# Patient Record
Sex: MALE | Race: WHITE | NOT HISPANIC OR LATINO | Employment: STUDENT | URBAN - METROPOLITAN AREA
[De-identification: names, ages, dates, MRNs, and addresses within clinical notes are randomized per-mention and may not be internally consistent; named-entity substitution may affect disease eponyms.]

---

## 2023-11-27 DIAGNOSIS — G40.109 EPILEPSY, LOCALIZATION-RELATED (MULTI): ICD-10-CM

## 2023-11-27 RX ORDER — MIDAZOLAM 5 MG/.1ML
1 SPRAY NASAL AS NEEDED
COMMUNITY
End: 2024-03-11 | Stop reason: SDUPTHER

## 2023-11-27 RX ORDER — CENOBAMATE 200 MG/1
2 TABLET, FILM COATED ORAL NIGHTLY
COMMUNITY
Start: 2023-11-13 | End: 2023-12-12 | Stop reason: SDUPTHER

## 2023-11-27 RX ORDER — PERAMPANEL 10 MG/1
10 TABLET ORAL NIGHTLY
COMMUNITY
End: 2023-11-27 | Stop reason: SDUPTHER

## 2023-11-27 RX ORDER — TOPIRAMATE 200 MG/1
3 CAPSULE, EXTENDED RELEASE ORAL NIGHTLY
COMMUNITY
End: 2023-12-12 | Stop reason: SDUPTHER

## 2023-12-12 DIAGNOSIS — G40.109 EPILEPSY, LOCALIZATION-RELATED (MULTI): ICD-10-CM

## 2023-12-12 RX ORDER — CENOBAMATE 200 MG/1
2 TABLET, FILM COATED ORAL NIGHTLY
Qty: 60 TABLET | Refills: 5 | Status: SHIPPED | OUTPATIENT
Start: 2023-12-12 | End: 2024-06-09

## 2023-12-12 RX ORDER — TOPIRAMATE 200 MG/1
3 CAPSULE, EXTENDED RELEASE ORAL NIGHTLY
Qty: 90 CAPSULE | Refills: 5 | Status: SHIPPED | OUTPATIENT
Start: 2023-12-12 | End: 2024-06-09

## 2024-03-11 DIAGNOSIS — R56.9 SEIZURE (MULTI): ICD-10-CM

## 2024-03-11 RX ORDER — MIDAZOLAM 5 MG/.1ML
5 SPRAY NASAL AS NEEDED
Qty: 4 EACH | Refills: 1 | Status: SHIPPED | OUTPATIENT
Start: 2024-03-11 | End: 2025-03-11

## 2024-05-28 DIAGNOSIS — G40.109 EPILEPSY, LOCALIZATION-RELATED (MULTI): ICD-10-CM

## 2024-05-28 NOTE — TELEPHONE ENCOUNTER
Renewal request received for Fycompa 10mg.   Upcoming appointment 8/21/24.    Dede Bateman, BSN, RN  Registered Nurse Level 3  Pediatric Epilepsy

## 2024-08-20 ENCOUNTER — TELEPHONE (OUTPATIENT)
Dept: PEDIATRIC NEUROLOGY | Facility: HOSPITAL | Age: 24
End: 2024-08-20
Payer: COMMERCIAL

## 2024-08-20 NOTE — TELEPHONE ENCOUNTER
*fuv // Taylor confirmed 8/14/24 11:38am/ mom oked appt for aug 21 24 @ 11 am in [place] with Dr Yuliet hardinw

## 2024-08-21 ENCOUNTER — APPOINTMENT (OUTPATIENT)
Dept: PEDIATRIC NEUROLOGY | Facility: CLINIC | Age: 24
End: 2024-08-21
Payer: COMMERCIAL

## 2024-08-21 VITALS
HEIGHT: 65 IN | OXYGEN SATURATION: 98 % | WEIGHT: 275.57 LBS | SYSTOLIC BLOOD PRESSURE: 115 MMHG | BODY MASS INDEX: 45.91 KG/M2 | DIASTOLIC BLOOD PRESSURE: 76 MMHG | TEMPERATURE: 97.8 F | HEART RATE: 50 BPM

## 2024-08-21 DIAGNOSIS — G40.109 EPILEPSY, LOCALIZATION-RELATED (MULTI): ICD-10-CM

## 2024-08-21 DIAGNOSIS — G40.109: ICD-10-CM

## 2024-08-21 PROCEDURE — 3008F BODY MASS INDEX DOCD: CPT | Performed by: PSYCHIATRY & NEUROLOGY

## 2024-08-21 PROCEDURE — 99215 OFFICE O/P EST HI 40 MIN: CPT | Performed by: PSYCHIATRY & NEUROLOGY

## 2024-08-21 NOTE — PROGRESS NOTES
Patient Discussion/Summary  1. Continue Torkendi XR to 600 mg once a day.     2. Continue Fycompa to 10 mg at bedtime     3. Continue Xcopri to 400 mg. Call with seizure report     4. Follow up in May 2025 VIRTUALLY if stable-he needs to be seen every 9 months for the purpose of prior authorization    5. 10 year Handicap placard, and I-PAD communication device.    6. 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 His nocturnal frontal lobe epilepsy was confirmed by video EEG as does his brother. He has been quite resistant to medical management in the past. His prior MRI scans have been normal.     Carrier for Claudia Olivier. Parents will get tested.     This 24hr vEEG confirms the diagnosis of nocturnal frontal lobe epilepsy. We recorded 2 subclinical seizures during sleep, arising from the right frontal region with temporal spread. The interictal EEG showed continuous slowing over the right hemisphere, maximum right fronto-temporal, as well as spikes in that same region, that were activated during sleep. His Zonisamide will remain unchanged at 600mg QHS; however, the plan is to increase the Onfi from 30mg at bedtime to 40mg at bedtime. Follow up with Dr. Horvath. Patient had a brain MRI scheduled early morning on 11/18 after discharge ~ results pending. Patient was discharged on 11/18/2015. 11/2015:     Past AEDs include Keppra, phenobarbital, and Trileptal. He has maxed out on Onfi and syndesmotic. He continues to have seizures per father. I will try Fycompa after weaning off Onfi. Father does not think that Onfi is helping or or has helped. We will wean off of zonisamide at that same time Fycompa is initiated. Discussed the side effects of Fycompa     Interim history as of 6/21/2017:  3 yelling episodes last night. The family woke up. Seizures occur 2  times a week. There may be ones where he does not scream loudly at night.   Still have not heard from Dr. Alegria's office about [facility] insurance. Epilepsy surgery decided and waiting.  ------------------------------------  As of 6/6/2018:  Seizure semiology has not changed. He has seizures at night at least 4 x per week, may be more. He has also had some bed wetting episodes. Father lost his job and insurance; he was unable to bring his son to the clinic until he obtained [facility] and medicaid. +weight gain. Medication side effects reviewed.  ----------------------------------  As of 10/24/2018:  NO seizures as seen before since the epilepsy surgery-temporal lobectomy- in July 2018. Weill wean Onfi by 5 mg weekly. Continue Trokendi XR.  -----------------------------------------------------------  As of 3/13/2019:  Will need a follow up brain MRI and 48 hr VEEG. Trokendi XR was increased to 500 mg QD for EEG seizures last admission to EMU in Feb 2019. Increased words, more alert. No clinical seizure.  -------------------------------------------------------------  As of 9/24/2019  He was admitted for a 24 hour repeat vEEG to assess efficacy of med increase and to perform post-op brain MRI (under Peds Anesthesia). Two subclinical seizure during sleep, arising from the right frontal region were recorded. Interictal EEG findings show right fronto-temporal spikes and continuous slow right.  MRI showed postoperative changes.     I will increase Trokendi XR by 100 mg given his weight, which is almost double the ideal adult weight. He has night time awakenings that are probably brief seizures as they resemble the ones recorded in the EMU in June 2019.  -------------------------------------------------  As of 3/23/2020  Telehealth visit-audio only      Suhail is doing well. He is more verbal and does more daily activities than he has before. Dad think he may be having some seizures in sleep because he vocalizes sometimes. But in  light of his improved alertness, verbal ability, and daily activities, dad is comfortable postponing any medication changes. I did introduce the idea of adding LMG at some point.  ---------------------------  As of 7/15/2020  In June 2020, in response to convulsive seizures, Fycompa and clorazepate were added as below. No seizure since. But Dad think Suhail may be more sleepy.  - Start Fycompa 4mg QHS x 2 weeks, 6mg QHS x 2 weeks, 8mg QHS x 2 weeks, 10mg QHS thereafter.  - clorazepate 7.5mg BID x 1 week, then 15mg BID thereafter as a bridge until the Fycompa is at full dose.     Previous AEDs: LEV, OXC, PB, TPM, Onfi, Vimpat, ZON, Trokendi  Untried AEDs: ZNS, LTG, VPA (but he is already obese), PHT, PB, CBZ, FBM, RUF   ---------------------------  As of 5/26/2021     Seizure frequency is better. Probably 3-4 times a week. Still wets his bed daily. Had seen a urologist who diagnosed him with UTI and treated with ABX.  ---------------  As of 10/27/2021  Doing well. May have some seizures, but unable to tell. MOm says he has not seen seizures. Had COVID Aug 16.   Wt 123 kg. Lost 20 poiunds since April, last visit. Xcopri has been helpful.     Current AEDs:   - Trokendi XR 200mg tabs = 600mg (3 at HS) ~7.2 mg/kg/day  - Fycompa 10mg HS (started June 2020)  - Xcopri 300 mg/day since 3/30/21  --------------------------  As of 5/11/2022  Doing well. May have some seizures, but unable to tell. Xcopri has been helpful.  Stable. Seizures seems stable. NO worsening of seizures.   Current AEDs:   - Trokendi XR 200mg tabs = 600mg (3 at HS) ~7.2 mg/kg/day  - Fycompa 10mg HS (started June 2020)  - Xcopri 300 mg/day since 3/30/21  ------------------------------------  As of 11/9/2022  Doing well. May have some seizures, but unable to tell. Xcopri has been helpful.  Stable. Seizures seems stable. NO worsening of seizures.   Current AEDs:   - Trokendi XR 200mg tabs = 600mg (3 at HS) ~7.2 mg/kg/day  - Fycompa 10mg HS (started June  2020)  - Xcopri 300 mg/day since 3/30/21  ------------------------------------  As of 5/24/2023  Doing well. May have some seizures, but unable to tell. Xcopri has been helpful.  Stable. Seizures seems stable. NO worsening of seizures.   Current AEDs:   - Trokendi XR 200mg tabs = 600mg (3 at HS) ~7.2 mg/kg/day  - Fycompa 10mg HS (started June 2020)  - Xcopri 300 mg/day since 3/30/21-->increase to 400 mg QD as ther eis room for improvement of seizures. He also gained some weight     -------------------------------------------------------------------------------------------------------------------------------------  Risk and benefits were discussed in detail, and the plan reflects preference of the caretaker(s). Anticipatory guidance regarding seizure precaution was given. Antiepileptic drug (s) side effects, safe handling, monitoring for possible neuropsychiatric comorbidities, as well as the the rare possibility of SUDEP were discussed.   This note was created using speech recognition transcription software. Despite proofreading, several typographical errors might be present that might affect the meaning of the content. Please call with any questions.  ------------------------------------------------------------------------------------------------------------------------------------------  CONTROLLED SUBSTANCE-DOCUMENTATION     I have personally reviewed the OAS report. This report is scanned into the electronic medical record. I have considered the risks of abuse, dependence, addiction and diversion. I believe that it is clinically appropriate to be prescribed this medication. Also, I believe that it is clinically appropriate for this patient to be prescribed this medication. Based on the patient's condition and response to current treatment regimen, I do not feel that it is clinically necessary for this patient to be seen in the office every 90 days.      (diazepam, clonazepam, IN midazolam)  What is the  "patient's goal of therapy? Seizure rescue medicine  Is this being achieved with current treatment? Yes     (clobazam, lacosamide, perampanel, Epidiolex, briviacetam)  What is the patient's goal of therapy? Seizure treatment  Is this being achieved with current treatment? Yes     UDS is not clinically indicated.  Assessed for risk of addiction, abuse and/or diversion using \"red flags.\"  Patient was counseled on the abuse potential. Patient was educated on the risk and effect of combining multiple controlled substances. Patient voiced understanding of the risks of combination of opioids and benzodiazepines, and I discussed alternative treatment options when applicable.   Patient was reminded that it is both unsafe and unlawful to give away or sell controlled substance.  Discussed proper and secure storage and disposal of unused medications.         Chief Complaint     patient is here for follow up, Epilespy   Accompanied by father.      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 His nocturnal frontal lobe epilepsy was confirmed by video EEG as does his brother. He has been quite resistant to medical management in the past. His prior MRI scans have been normal.     video EEG evaluation with his brother which showed right temporal discharges during a number of hypermotor seizures that he had every night.     Past visit:  Has seizures every few days at night per father. Last VEEG recorded seizures in 11/2015:  This 24hr vEEG confirms the diagnosis of nocturnal frontal lobe epilepsy. We recorded 2 subclinical seizures during sleep, arising from the right frontal region with temporal spread. The interictal EEG showed continuous slowing over the right hemisphere, maximum right fronto-temporal, as well as spikes in that same region, that were activated during sleep. His Zonisamide will remain unchanged at 600mg " QHS; however, the plan is to increase the Onfi from 30mg at bedtime to 40mg at bedtime. Follow up with Dr. Horvath. Patient had a brain MRI scheduled early morning on 11/18 after discharge ~ results pending. Patient was discharged on 11/18/2015. 11/2015:     Past AEDs: PHB, Keppra, OXC, ZNG, ONfi  --------------------------  Inerim hisotory as of 6/21/2017:  3 yelling episodes last night. The family woke up. Seizures occur 2 times a week. There may be ones where he does not scream loudly at night.   Still have not heard from Dr. Alegria's office about [facility] iinsurance.   ------------------------------  As of 6/6/2018:  Seizure semiology has not changed. He has seizures at night at least 4 x per week, may be more. He has also had some bed wetting episodes. Father lost his job and insurance; he was unable to bring his son to the clinic until he obtained [facility] and medicaid.  ---------------------  As of 10/24/2018:  NO seizures as seen before since the epilepsy surgery-temporal lobectomy- in July 2018. Weill wean Onfi by 5 mg weekly. Continue Trokendi XR.  -----------------------------------  As of 3/13/2019:     Will need a follow up brain MRI and 48 hr VEEG. Trokendi XR was increased to 500 mg QD for EEG seizures last admission to EMU in Feb 2019. Increased words, more alert. No clinical seizure.  2/2019 VEEG post surgery:  Suhail is a 18 year old boy status post right temporal lobectomy in July 2018, here for a 6 month follow up. Onfi was weaned off and remains on monotherapy Trokendi XR 400mg QHS. During this 48hr vEEG we recorded eight subclinical EEG seizures out of sleep, arising from the right frontal region. Trokendi XR will be increased to 500mg QHS,. He will need to come back in a couple of weeks for a repeat vEEG and to get a brain MRI.   ---------------------------------------------------------------  As of 9/24/2019  He was admitted for a 24 hour repeat vEEG to assess efficacy of med increase and to perform  "post-op brain MRI (under Peds Anesthesia). Two subclinical seizure during sleep, arising from the right frontal region were recorded. Interictal EEG findings show right fronto-temporal spikes and continuous slow right.  MRI showed postoperative changes.  ONfi weaned off since no seizures, but since 2 seizures were recorded during 24 hr admission to EMU, I will consider add back Onfi. Other choice is Fycompa.  ----------------------  As of 3/23/2020  Telehealth visit-audio only     I spoke with Suhail's dad. Suhail is doing well. He is more verbal and does more daily activities than he has before. Dad think he may be having some seizures in sleep because he vocalizes sometimes. But in light of his improved alertness, verbal ability, and daily activities, dad is comfortable postponing any medication changes. I did introduce the idea of adding LMG at some point.  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 At 1:30AM and 6:30AM he had GTCs this morning. He received KLN with the last one. Is sleeping now. No fever. Has had diarrhea and has been getting Immodium. No other signs of illness. Yesterday he was quieter than usual, had a \"head/neck tremor\" 3-4 times yesterday lasting 10 sec each. Dad had a hard time describing it. He has a rash on the back of his neck since yesterday (red, spotty, not raised, about the size of an orange)  - Start Fycompa 4mg QHS x 2 weeks, 6mg QHS x 2 weeks, 8mg QHS x 2 weeks, 10mg QHS thereafter.  - clorazepate 7.5mg BID x 1 week, then 15mg " BID thereafter as a bridge until the Fycompa is at full dose.     Dad states Suhail seems to be sleepy more often. No seizures since the medication changes on June 4.  --------------------  As of 5/26/2021  Seizure frequency is better. Probably 3-4 times a week. Still wets his bed daily. Had seen a urologist who diagnosed him with UTI and treated with ABX. Overweight 290 pounds  ----------------  As of 10/27/2021     Current AEDs:   - Trokendi XR 200mg tabs = 600mg (3 at HS) ~7.2mg/kg/day  - Fycompa 10mg HS (started June 2020)  - Xcopri 200mg/day since 3/30/21  ---------------------  As of 5/11/2022  - Trokendi XR 200mg tabs = 600mg (3 at HS) ~7.2mg/kg/day  - Fycompa 10mg HS (started June 2020)  - Xcopri 300mg/day since 3/30/21     Stable. Seizures seems stable. NO worsening of seizures.   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  And as a result lost weight!  He now travels with his dad during his job.  Ernesto also goes bowling and fishing with his dad.  Rare episodes at night -screaming. Dad doesn't remember the last time.   Stable. Seizures seems stable. NO worsening of seizures.     REVIEW OF SYSTEMS:  A complete review of systems was performed and was negative for complaint with the exception of that noted. Gained weight, but stable height. 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     Mental status: baseline  Speech: Nonverbal  Cranial Nerves:            Visual Fields: untestable            EOM: intact   Pupils: untestable   Face: symmetric smile   Hearing: Appears to be intact to voice   Palate: untestable   Shoulders: Untestable   Tongue: midline  Motor exam: No tremor   Arms: Arm movements against gravity normal   Legs: Normal ambulation  Sensory exam: untestable  Cerebellar: Normal gait  Reflexes: untestable  CV: nml S1S2, RRR, no mm  lungs: CTAB

## 2024-08-22 RX ORDER — CENOBAMATE 200 MG/1
2 TABLET, FILM COATED ORAL NIGHTLY
Qty: 180 TABLET | Refills: 1 | Status: SHIPPED | OUTPATIENT
Start: 2024-08-22 | End: 2025-02-18

## 2024-08-22 RX ORDER — TOPIRAMATE 200 MG/1
3 CAPSULE, EXTENDED RELEASE ORAL NIGHTLY
Qty: 270 CAPSULE | Refills: 1 | Status: SHIPPED | OUTPATIENT
Start: 2024-08-22 | End: 2025-02-18

## 2024-08-28 DIAGNOSIS — G40.109: ICD-10-CM

## 2024-08-28 DIAGNOSIS — G40.109 EPILEPSY, LOCALIZATION-RELATED (MULTI): ICD-10-CM

## 2024-08-28 RX ORDER — TOPIRAMATE 200 MG/1
3 CAPSULE, EXTENDED RELEASE ORAL NIGHTLY
Qty: 90 CAPSULE | Refills: 5 | Status: SHIPPED | OUTPATIENT
Start: 2024-08-28

## 2024-10-29 ENCOUNTER — TELEPHONE (OUTPATIENT)
Dept: PEDIATRIC NEUROLOGY | Facility: HOSPITAL | Age: 24
End: 2024-10-29
Payer: COMMERCIAL

## 2024-10-29 DIAGNOSIS — G40.109 EPILEPSY, LOCALIZATION-RELATED (MULTI): Primary | ICD-10-CM

## 2024-10-30 RX ORDER — TOPIRAMATE 200 MG/1
600 CAPSULE, EXTENDED RELEASE ORAL DAILY
Qty: 180 CAPSULE | Refills: 4 | Status: SHIPPED | OUTPATIENT
Start: 2024-10-30 | End: 2024-12-29

## 2025-02-24 DIAGNOSIS — G40.109 EPILEPSY, LOCALIZATION-RELATED (MULTI): ICD-10-CM

## 2025-02-24 DIAGNOSIS — G40.109: ICD-10-CM

## 2025-02-25 RX ORDER — CENOBAMATE 200 MG/1
2 TABLET, FILM COATED ORAL NIGHTLY
Qty: 180 TABLET | Refills: 1 | Status: SHIPPED | OUTPATIENT
Start: 2025-02-25 | End: 2025-08-24

## 2025-02-25 RX ORDER — TOPIRAMATE 200 MG/1
600 CAPSULE, EXTENDED RELEASE ORAL DAILY
Qty: 180 CAPSULE | Refills: 4 | Status: SHIPPED | OUTPATIENT
Start: 2025-02-25 | End: 2025-04-26

## 2025-04-30 ENCOUNTER — TELEPHONE (OUTPATIENT)
Dept: PEDIATRIC NEUROLOGY | Facility: CLINIC | Age: 25
End: 2025-04-30
Payer: COMMERCIAL

## 2025-04-30 NOTE — TELEPHONE ENCOUNTER
Father called [facility]  re: upcoming appointment. Appointment needs to be rescheduled as child has another appointment at similar time and would be late arriving for Dr. Horvath's allotted appointment time.  Will forward message to  to reschedule appointment.

## 2025-05-05 ENCOUNTER — TELEPHONE (OUTPATIENT)
Dept: PEDIATRIC NEUROLOGY | Facility: HOSPITAL | Age: 25
End: 2025-05-05
Payer: COMMERCIAL

## 2025-05-05 NOTE — TELEPHONE ENCOUNTER
Called mom on May 5th @ 320  pm and left msg to confirm appt for May 7th @ 8 1100am in [place]. Lm for mom to call back, no my chart.  Dad oked the appt

## 2025-05-07 ENCOUNTER — APPOINTMENT (OUTPATIENT)
Dept: PEDIATRIC NEUROLOGY | Facility: CLINIC | Age: 25
End: 2025-05-07
Payer: COMMERCIAL

## 2025-05-07 VITALS
OXYGEN SATURATION: 98 % | RESPIRATION RATE: 18 BRPM | HEART RATE: 65 BPM | DIASTOLIC BLOOD PRESSURE: 64 MMHG | WEIGHT: 279.98 LBS | SYSTOLIC BLOOD PRESSURE: 118 MMHG | TEMPERATURE: 98.6 F | BODY MASS INDEX: 45 KG/M2 | HEIGHT: 66 IN

## 2025-05-07 DIAGNOSIS — G40.109 EPILEPSY, LOCALIZATION-RELATED (MULTI): ICD-10-CM

## 2025-05-07 DIAGNOSIS — G40.804 INTRACTABLE EPILEPSY WITH BOTH GENERALIZED AND FOCAL FEATURES (MULTI): Primary | ICD-10-CM

## 2025-05-07 DIAGNOSIS — G40.109 PARTIAL EPILEPSY ORIGINATING IN FRONTAL LOBE: ICD-10-CM

## 2025-05-07 PROCEDURE — 3008F BODY MASS INDEX DOCD: CPT | Performed by: PSYCHIATRY & NEUROLOGY

## 2025-05-07 PROCEDURE — 99214 OFFICE O/P EST MOD 30 MIN: CPT | Performed by: PSYCHIATRY & NEUROLOGY

## 2025-05-07 RX ORDER — CENOBAMATE 200 MG/1
2 TABLET, FILM COATED ORAL NIGHTLY
Qty: 360 TABLET | Refills: 3 | Status: SHIPPED | OUTPATIENT
Start: 2025-05-07 | End: 2025-11-03

## 2025-05-07 RX ORDER — TOPIRAMATE 200 MG/1
3 CAPSULE, EXTENDED RELEASE ORAL NIGHTLY
Qty: 90 CAPSULE | Refills: 3 | Status: SHIPPED | OUTPATIENT
Start: 2025-05-07 | End: 2025-06-06

## 2025-05-07 RX ORDER — MIDAZOLAM 5 MG/.1ML
5 SPRAY NASAL ONCE AS NEEDED
Qty: 2 EACH | Refills: 2 | Status: SHIPPED | OUTPATIENT
Start: 2025-05-07

## 2025-05-07 NOTE — PROGRESS NOTES
Patient Discussion/Summary  1. Continue Torkendi XR to 600 mg once a day.     2. Continue Fycompa to 10 mg at bedtime     3. Continue Xcopri to 400 mg. Call with seizure report     4. Follow up in Feb 2026     5. 10 year Handicap placard, and I-PAD communication device.    6. Q3 mo supply of Xcopri and Trokendi Xr and Fycompa. *Wants to pick all 3 meds at the same time.       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 His nocturnal frontal lobe epilepsy was confirmed by video EEG as does his brother. He has been quite resistant to medical management in the past. His prior MRI scans have been normal.     Carrier for Claudia Olivier. Parents will get tested.     This 24hr vEEG confirms the diagnosis of nocturnal frontal lobe epilepsy. We recorded 2 subclinical seizures during sleep, arising from the right frontal region with temporal spread. The interictal EEG showed continuous slowing over the right hemisphere, maximum right fronto-temporal, as well as spikes in that same region, that were activated during sleep. His Zonisamide will remain unchanged at 600mg QHS; however, the plan is to increase the Onfi from 30mg at bedtime to 40mg at bedtime. Follow up with Dr. Horvath. Patient had a brain MRI scheduled early morning on 11/18 after discharge ~ results pending. Patient was discharged on 11/18/2015. 11/2015:     Past AEDs include Keppra, phenobarbital, and Trileptal. He has maxed out on Onfi and syndesmotic. He continues to have seizures per father. I will try Fycompa after weaning off Onfi. Father does not think that Onfi is helping or or has helped. We will wean off of zonisamide at that same time Fycompa is initiated. Discussed the side effects of Fycompa     Interim history as of 6/21/2017:  3 yelling episodes last night. The family woke up. Seizures occur 2 times a week. There may be ones where he does not  scream loudly at night.   Still have not heard from Dr. Alegria's office about [facility] insurance. Epilepsy surgery decided and waiting.  ------------------------------------  As of 6/6/2018:  Seizure semiology has not changed. He has seizures at night at least 4 x per week, may be more. He has also had some bed wetting episodes. Father lost his job and insurance; he was unable to bring his son to the clinic until he obtained [facility] and medicaid. +weight gain. Medication side effects reviewed.  ----------------------------------  As of 10/24/2018:  NO seizures as seen before since the epilepsy surgery-temporal lobectomy- in July 2018. Weill wean Onfi by 5 mg weekly. Continue Trokendi XR.  -----------------------------------------------------------  As of 3/13/2019:  Will need a follow up brain MRI and 48 hr VEEG. Trokendi XR was increased to 500 mg QD for EEG seizures last admission to EMU in Feb 2019. Increased words, more alert. No clinical seizure.  -------------------------------------------------------------  As of 9/24/2019  He was admitted for a 24 hour repeat vEEG to assess efficacy of med increase and to perform post-op brain MRI (under Peds Anesthesia). Two subclinical seizure during sleep, arising from the right frontal region were recorded. Interictal EEG findings show right fronto-temporal spikes and continuous slow right.  MRI showed postoperative changes.     I will increase Trokendi XR by 100 mg given his weight, which is almost double the ideal adult weight. He has night time awakenings that are probably brief seizures as they resemble the ones recorded in the EMU in June 2019.  -------------------------------------------------  As of 3/23/2020  Telehealth visit-audio only      Suhail is doing well. He is more verbal and does more daily activities than he has before. Dad think he may be having some seizures in sleep because he vocalizes sometimes. But in light of his improved alertness, verbal ability,  and daily activities, dad is comfortable postponing any medication changes. I did introduce the idea of adding LMG at some point.  ---------------------------  As of 7/15/2020  In June 2020, in response to convulsive seizures, Fycompa and clorazepate were added as below. No seizure since. But Dad think Suhail may be more sleepy.  - Start Fycompa 4mg QHS x 2 weeks, 6mg QHS x 2 weeks, 8mg QHS x 2 weeks, 10mg QHS thereafter.  - clorazepate 7.5mg BID x 1 week, then 15mg BID thereafter as a bridge until the Fycompa is at full dose.     Previous AEDs: LEV, OXC, PB, TPM, Onfi, Vimpat, ZON, Trokendi  Untried AEDs: ZNS, LTG, VPA (but he is already obese), PHT, PB, CBZ, FBM, RUF   ---------------------------  As of 5/26/2021     Seizure frequency is better. Probably 3-4 times a week. Still wets his bed daily. Had seen a urologist who diagnosed him with UTI and treated with ABX.  ---------------  As of 10/27/2021  Doing well. May have some seizures, but unable to tell. MOm says he has not seen seizures. Had COVID Aug 16.   Wt 123 kg. Lost 20 poiunds since April, last visit. Xcopri has been helpful.     Current AEDs:   - Trokendi XR 200mg tabs = 600mg (3 at HS) ~7.2 mg/kg/day  - Fycompa 10mg HS (started June 2020)  - Xcopri 300 mg/day since 3/30/21  --------------------------  As of 5/11/2022  Doing well. May have some seizures, but unable to tell. Xcopri has been helpful.  Stable. Seizures seems stable. NO worsening of seizures.   Current AEDs:   - Trokendi XR 200mg tabs = 600mg (3 at HS) ~7.2 mg/kg/day  - Fycompa 10mg HS (started June 2020)  - Xcopri 300 mg/day since 3/30/21  ------------------------------------  As of 11/9/2022  Doing well. May have some seizures, but unable to tell. Xcopri has been helpful.  Stable. Seizures seems stable. NO worsening of seizures.   Current AEDs:   - Trokendi XR 200mg tabs = 600mg (3 at HS) ~7.2 mg/kg/day  - Fycompa 10mg HS (started June 2020)  - Xcopri 300 mg/day since  3/30/21  ------------------------------------  As of 5/24/2023  Doing well. May have some seizures, but unable to tell. Xcopri has been helpful.  Stable. Seizures seems stable. NO worsening of seizures.   Current AEDs:   - Trokendi XR 200mg tabs = 600mg (3 at HS) ~7.2 mg/kg/day  - Fycompa 10mg HS (started June 2020)  - Xcopri 300 mg/day since 3/30/21-->increase to 400 mg QD as ther eis room for improvement of seizures. 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 No convulsive seizures.  -------------------------------------------------------------------------------------------------------------------------------------  Risk and benefits were discussed in detail, and the plan reflects preference of the caretaker(s). Anticipatory guidance regarding seizure precaution was given. Antiepileptic drug (s) side effects, safe handling, monitoring for possible neuropsychiatric comorbidities, as well as the the rare possibility of SUDEP were discussed.   This note was created using speech recognition transcription software. Despite proofreading, several typographical errors might be present that might affect the meaning of the content. Please call with any questions.  ------------------------------------------------------------------------------------------------------------------------------------------  CONTROLLED SUBSTANCE-DOCUMENTATION     I have personally reviewed the OARRS report. This report is scanned into the electronic  "medical record. I have considered the risks of abuse, dependence, addiction and diversion. I believe that it is clinically appropriate to be prescribed this medication. Also, I believe that it is clinically appropriate for this patient to be prescribed this medication. Based on the patient's condition and response to current treatment regimen, I do not feel that it is clinically necessary for this patient to be seen in the office every 90 days.      (diazepam, clonazepam, IN midazolam)  What is the patient's goal of therapy? Seizure rescue medicine  Is this being achieved with current treatment? Yes     (clobazam, lacosamide, perampanel, Epidiolex, briviacetam)  What is the patient's goal of therapy? Seizure treatment  Is this being achieved with current treatment? Yes     UDS is not clinically indicated.  Assessed for risk of addiction, abuse and/or diversion using \"red flags.\"  Patient was counseled on the abuse potential. Patient was educated on the risk and effect of combining multiple controlled substances. Patient voiced understanding of the risks of combination of opioids and benzodiazepines, and I discussed alternative treatment options when applicable.   Patient was reminded that it is both unsafe and unlawful to give away or sell controlled substance.  Discussed proper and secure storage and disposal of unused medications.         Chief Complaint     patient is here for follow up, Epilespy   Accompanied by father.      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 His nocturnal frontal lobe epilepsy was confirmed by video EEG as does his brother. He has been quite resistant to medical management in the past. His prior MRI scans have been normal.     video EEG evaluation with his brother which showed right temporal discharges during a number of hypermotor seizures that he had every night.     Past " visit:  Has seizures every few days at night per father. Last VEEG recorded seizures in 11/2015:  This 24hr vEEG confirms the diagnosis of nocturnal frontal lobe epilepsy. We recorded 2 subclinical seizures during sleep, arising from the right frontal region with temporal spread. The interictal EEG showed continuous slowing over the right hemisphere, maximum right fronto-temporal, as well as spikes in that same region, that were activated during sleep. His Zonisamide will remain unchanged at 600mg QHS; however, the plan is to increase the Onfi from 30mg at bedtime to 40mg at bedtime. Follow up with Dr. Horvath. Patient had a brain MRI scheduled early morning on 11/18 after discharge ~ results pending. Patient was discharged on 11/18/2015. 11/2015:     Past AEDs: PHB, Keppra, OXC, ZNG, ONfi  --------------------------  Inerim hisotory as of 6/21/2017:  3 yelling episodes last night. The family woke up. Seizures occur 2 times a week. There may be ones where he does not scream loudly at night.   Still have not heard from Dr. Alegria's office about [facility] iinsurance.   ------------------------------  As of 6/6/2018:  Seizure semiology has not changed. He has seizures at night at least 4 x per week, may be more. He has also had some bed wetting episodes. Father lost his job and insurance; he was unable to bring his son to the clinic until he obtained [facility] and medicaid.  ---------------------  As of 10/24/2018:  NO seizures as seen before since the epilepsy surgery-temporal lobectomy- in July 2018. Weicherri wean Onfi by 5 mg weekly. Continue Trokendi XR.  -----------------------------------  As of 3/13/2019:     Will need a follow up brain MRI and 48 hr VEEG. Trokendi XR was increased to 500 mg QD for EEG seizures last admission to U in Feb 2019. Increased words, more alert. No clinical seizure.  2/2019 VEEG post surgery:  Suhail is a 18 year old boy status post right temporal lobectomy in July 2018, here for a 6 month follow  up. Onfi was weaned off and remains on monotherapy Trokendi XR 400mg QHS. During this 48hr vEEG we recorded eight subclinical EEG seizures out of sleep, arising from the right frontal region. Trokendi XR will be increased to 500mg QHS,. He will need to come back in a couple of weeks for a repeat vEEG and to get a brain MRI.   ---------------------------------------------------------------  As of 9/24/2019  He was admitted for a 24 hour repeat vEEG to assess efficacy of med increase and to perform post-op brain MRI (under Peds Anesthesia). Two subclinical seizure during sleep, arising from the right frontal region were recorded. Interictal EEG findings show right fronto-temporal spikes and continuous slow right.  MRI showed postoperative changes.  ONfi weaned off since no seizures, but since 2 seizures were recorded during 24 hr admission to EMU, I will consider add back Onfi. Other choice is Fycompa.  ----------------------  As of 3/23/2020  Telehealth visit-audio only     I spoke with Suhail's dad. Suhail is doing well. He is more verbal and does more daily activities than he has before. Dad think he may be having some seizures in sleep because he vocalizes sometimes. But in light of his improved alertness, verbal ability, and daily activities, dad is comfortable postponing any medication changes. I did introduce the idea of adding LMG at some point.  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 At 1:30AM and 6:30AM he had GTCs this morning. He received KLN with the  "last one. Is sleeping now. No fever. Has had diarrhea and has been getting Immodium. No other signs of illness. Yesterday he was quieter than usual, had a \"head/neck tremor\" 3-4 times yesterday lasting 10 sec each. Dad had a hard time describing it. He has a rash on the back of his neck since yesterday (red, spotty, not raised, about the size of an orange)  - Start Fycompa 4mg QHS x 2 weeks, 6mg QHS x 2 weeks, 8mg QHS x 2 weeks, 10mg QHS thereafter.  - clorazepate 7.5mg BID x 1 week, then 15mg BID thereafter as a bridge until the Fycompa is at full dose.     Jen states Suhail seems to be sleepy more often. No seizures since the medication changes on June 4.  --------------------  As of 5/26/2021  Seizure frequency is better. Probably 3-4 times a week. Still wets his bed daily. Had seen a urologist who diagnosed him with UTI and treated with ABX. Overweight 290 pounds  ----------------  As of 10/27/2021     Current AEDs:   - Trokendi XR 200mg tabs = 600mg (3 at HS) ~7.2mg/kg/day  - Fycompa 10mg HS (started June 2020)  - Xcopri 200mg/day since 3/30/21  ---------------------  As of 5/11/2022  - Trokendi XR 200mg tabs = 600mg (3 at HS) ~7.2mg/kg/day  - Fycompa 10mg HS (started June 2020)  - Xcopri 300mg/day since 3/30/21     Stable. Seizures seems stable. NO worsening of seizures.   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  And as a result lost weight!  He now travels with his dad during his job.  Ernesto also goes bowling and fishing with his dad.  Rare episodes at night -screaming. Dad doesn't remember the last time.   Stable. Seizures seems stable. NO worsening of seizures.   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 No convulsive seizures.    REVIEW OF SYSTEMS:  A complete review of systems was performed and was negative for complaint with the exception of that noted. Gained weight, but stable height. 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     Mental status: baseline  Speech: Nonverbal  Cranial Nerves:            Visual Fields: untestable             EOM: intact   Pupils: untestable   Face: symmetric smile   Hearing: Appears to be intact to voice  Motor exam: No tremor   Arms: Arm movements against gravity normal   Legs: Normal ambulation  Sensory exam: untestable  Cerebellar: Normal gait  Reflexes: 2 + brachial, 1 + patellar b/l  CV: nml S1S2, RRR, no mm  lungs: CTAB

## 2025-07-09 ENCOUNTER — APPOINTMENT (OUTPATIENT)
Dept: PEDIATRIC NEUROLOGY | Facility: CLINIC | Age: 25
End: 2025-07-09
Payer: COMMERCIAL

## 2025-08-06 ENCOUNTER — TELEPHONE (OUTPATIENT)
Dept: PEDIATRIC NEUROLOGY | Facility: CLINIC | Age: 25
End: 2025-08-06
Payer: COMMERCIAL

## 2025-08-06 DIAGNOSIS — G40.109 EPILEPSY, LOCALIZATION-RELATED (MULTI): ICD-10-CM

## 2025-08-06 DIAGNOSIS — G40.109 PARTIAL EPILEPSY ORIGINATING IN FRONTAL LOBE: ICD-10-CM

## 2025-08-06 DIAGNOSIS — G40.804 INTRACTABLE EPILEPSY WITH BOTH GENERALIZED AND FOCAL FEATURES (MULTI): ICD-10-CM

## 2026-02-11 ENCOUNTER — APPOINTMENT (OUTPATIENT)
Dept: PEDIATRIC NEUROLOGY | Facility: CLINIC | Age: 26
End: 2026-02-11
Payer: COMMERCIAL